# Patient Record
Sex: MALE | Race: OTHER
[De-identification: names, ages, dates, MRNs, and addresses within clinical notes are randomized per-mention and may not be internally consistent; named-entity substitution may affect disease eponyms.]

---

## 2018-10-05 ENCOUNTER — HOSPITAL ENCOUNTER (OUTPATIENT)
Dept: HOSPITAL 54 - LAB | Age: 28
Discharge: HOME | End: 2018-10-05
Attending: FAMILY MEDICINE
Payer: COMMERCIAL

## 2018-10-05 DIAGNOSIS — Z00.01: Primary | ICD-10-CM

## 2018-10-05 DIAGNOSIS — Z11.3: ICD-10-CM

## 2018-10-05 LAB
ALBUMIN SERPL BCP-MCNC: 4.3 G/DL (ref 3.4–5)
ALP SERPL-CCNC: 49 U/L (ref 46–116)
ALT SERPL W P-5'-P-CCNC: 27 U/L (ref 12–78)
AST SERPL W P-5'-P-CCNC: 21 U/L (ref 15–37)
BASOPHILS # BLD AUTO: 0 /CMM (ref 0–0.2)
BASOPHILS NFR BLD AUTO: 0.3 % (ref 0–2)
BILIRUB SERPL-MCNC: 1.7 MG/DL (ref 0.2–1)
BUN SERPL-MCNC: 15 MG/DL (ref 7–18)
CALCIUM SERPL-MCNC: 8.8 MG/DL (ref 8.5–10.1)
CHLORIDE SERPL-SCNC: 102 MMOL/L (ref 98–107)
CHOLEST SERPL-MCNC: 136 MG/DL (ref ?–200)
CO2 SERPL-SCNC: 28 MMOL/L (ref 21–32)
CREAT SERPL-MCNC: 1 MG/DL (ref 0.6–1.3)
EOSINOPHIL NFR BLD AUTO: 1.8 % (ref 0–6)
GLUCOSE SERPL-MCNC: 86 MG/DL (ref 74–106)
HCT VFR BLD AUTO: 47 % (ref 39–51)
HDLC SERPL-MCNC: 46 MG/DL (ref 40–60)
HGB BLD-MCNC: 15.7 G/DL (ref 13.5–17.5)
LDLC SERPL DIRECT ASSAY-MCNC: 79 MG/DL (ref 0–99)
LYMPHOCYTES NFR BLD AUTO: 1.4 /CMM (ref 0.8–4.8)
LYMPHOCYTES NFR BLD AUTO: 31.5 % (ref 20–44)
MCHC RBC AUTO-ENTMCNC: 33 G/DL (ref 31–36)
MCV RBC AUTO: 89 FL (ref 80–96)
MONOCYTES NFR BLD AUTO: 0.3 /CMM (ref 0.1–1.3)
MONOCYTES NFR BLD AUTO: 7.1 % (ref 2–12)
NEUTROPHILS # BLD AUTO: 2.6 /CMM (ref 1.8–8.9)
NEUTROPHILS NFR BLD AUTO: 59.3 % (ref 43–81)
PLATELET # BLD AUTO: 237 /CMM (ref 150–450)
POTASSIUM SERPL-SCNC: 3.6 MMOL/L (ref 3.5–5.1)
PROT SERPL-MCNC: 7.3 G/DL (ref 6.4–8.2)
RBC # BLD AUTO: 5.29 MIL/UL (ref 4.5–6)
RDW COEFFICIENT OF VARIATION: 12.5 (ref 11.5–15)
SODIUM SERPL-SCNC: 138 MMOL/L (ref 136–145)
TRIGL SERPL-MCNC: 180 MG/DL (ref 30–150)
TSH SERPL DL<=0.005 MIU/L-ACNC: 0.73 UIU/ML (ref 0.36–3.74)
WBC NRBC COR # BLD AUTO: 4.4 K/UL (ref 4.3–11)

## 2019-01-10 ENCOUNTER — HOSPITAL ENCOUNTER (EMERGENCY)
Dept: HOSPITAL 91 - E/R | Age: 29
Discharge: HOME | End: 2019-01-10
Payer: COMMERCIAL

## 2019-01-10 ENCOUNTER — HOSPITAL ENCOUNTER (EMERGENCY)
Dept: HOSPITAL 10 - E/R | Age: 29
Discharge: HOME | End: 2019-01-10
Payer: COMMERCIAL

## 2019-01-10 VITALS
BODY MASS INDEX: 26.35 KG/M2 | HEIGHT: 69 IN | WEIGHT: 177.91 LBS | BODY MASS INDEX: 26.35 KG/M2 | WEIGHT: 177.91 LBS | HEIGHT: 69 IN

## 2019-01-10 VITALS — DIASTOLIC BLOOD PRESSURE: 75 MMHG | RESPIRATION RATE: 20 BRPM | SYSTOLIC BLOOD PRESSURE: 128 MMHG | HEART RATE: 102 BPM

## 2019-01-10 DIAGNOSIS — S61.431A: Primary | ICD-10-CM
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
## 2019-01-10 LAB
ADD MAN DIFF?: NO
ALANINE AMINOTRANSFERASE: 33 IU/L (ref 13–69)
ALBUMIN: 5.1 G/DL (ref 3.3–4.9)
ALKALINE PHOSPHATASE: 48 IU/L (ref 42–121)
ANION GAP: 13 (ref 5–13)
ASPARTATE AMINO TRANSFERASE: 30 IU/L (ref 15–46)
BASOPHIL #: 0 10^3/UL (ref 0–0.1)
BASOPHILS %: 0.2 % (ref 0–2)
BILIRUBIN,DIRECT: 0 MG/DL (ref 0–0.2)
BILIRUBIN,TOTAL: 1.4 MG/DL (ref 0.2–1.3)
BLOOD UREA NITROGEN: 12 MG/DL (ref 7–20)
CALCIUM: 10.2 MG/DL (ref 8.4–10.2)
CARBON DIOXIDE: 28 MMOL/L (ref 21–31)
CHLORIDE: 102 MMOL/L (ref 97–110)
CREATININE: 0.82 MG/DL (ref 0.61–1.24)
EOSINOPHILS #: 0 10^3/UL (ref 0–0.5)
EOSINOPHILS %: 0.2 % (ref 0–7)
GLUCOSE: 96 MG/DL (ref 70–220)
HEMATOCRIT: 45.8 % (ref 42–52)
HEMOGLOBIN: 16 G/DL (ref 14–18)
HEPATITIS B CORE ANTIBODY: NEGATIVE
HEPATITIS B SURFACE ANTIBODY: POSITIVE
HEPATITIS B SURFACE ANTIGEN: NEGATIVE
HEPATITIS C VIRAL ANTIBODY: NEGATIVE
HEPATITIS C VIRAL ANTIBODY: NEGATIVE
HIV 1&2 ANTIBODY: NEGATIVE
IMMATURE GRANS #M: 0.01 10^3/UL (ref 0–0.03)
IMMATURE GRANS % (M): 0.2 % (ref 0–0.43)
LIPASE: 160 U/L (ref 23–300)
LYMPHOCYTES #: 1.2 10^3/UL (ref 0.8–2.9)
LYMPHOCYTES %: 19.8 % (ref 15–51)
MEAN CORPUSCULAR HEMOGLOBIN: 29.8 PG (ref 29–33)
MEAN CORPUSCULAR HGB CONC: 34.9 G/DL (ref 32–37)
MEAN CORPUSCULAR VOLUME: 85.3 FL (ref 82–101)
MEAN PLATELET VOLUME: 10.3 FL (ref 7.4–10.4)
MONOCYTE #: 0.4 10^3/UL (ref 0.3–0.9)
MONOCYTES %: 5.8 % (ref 0–11)
NEUTROPHIL #: 4.6 10^3/UL (ref 1.6–7.5)
NEUTROPHILS %: 73.8 % (ref 39–77)
NUCLEATED RED BLOOD CELLS #: 0 10^3/UL (ref 0–0)
NUCLEATED RED BLOOD CELLS%: 0 /100WBC (ref 0–0)
PLATELET COUNT: 216 10^3/UL (ref 140–415)
POTASSIUM: 4.2 MMOL/L (ref 3.5–5.1)
RED BLOOD COUNT: 5.37 10^6/UL (ref 4.7–6.1)
RED CELL DISTRIBUTION WIDTH: 12 % (ref 11.5–14.5)
SODIUM: 143 MMOL/L (ref 135–144)
TOTAL PROTEIN: 7.9 G/DL (ref 6.1–8.1)
WHITE BLOOD COUNT: 6.2 10^3/UL (ref 4.8–10.8)

## 2019-01-10 PROCEDURE — 86803 HEPATITIS C AB TEST: CPT

## 2019-01-10 PROCEDURE — 86704 HEP B CORE ANTIBODY TOTAL: CPT

## 2019-01-10 PROCEDURE — 80048 BASIC METABOLIC PNL TOTAL CA: CPT

## 2019-01-10 PROCEDURE — 85025 COMPLETE CBC W/AUTO DIFF WBC: CPT

## 2019-01-10 PROCEDURE — 86706 HEP B SURFACE ANTIBODY: CPT

## 2019-01-10 PROCEDURE — 80076 HEPATIC FUNCTION PANEL: CPT

## 2019-01-10 PROCEDURE — 99283 EMERGENCY DEPT VISIT LOW MDM: CPT

## 2019-01-10 PROCEDURE — 87340 HEPATITIS B SURFACE AG IA: CPT

## 2019-01-10 PROCEDURE — 86703 HIV-1/HIV-2 1 RESULT ANTBDY: CPT

## 2019-01-10 PROCEDURE — 83690 ASSAY OF LIPASE: CPT

## 2019-01-10 PROCEDURE — 87536 HIV-1 QUANT&REVRSE TRNSCRPJ: CPT

## 2019-01-10 NOTE — ERD
ER Documentation


Chief Complaint


Chief Complaint





needle stick ( right hand)





HPI


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 


needlestick injury to the right palm while placing a 20-gauge intravenous line 


in a patient.  Site was copiously irrigated and cleansed.  Patient is 


asymptomatic and has no complaints, no significant past medical history to 


disclose related to this incident





ROS


All systems reviewed and are negative except as per history of present illness.





Allergies


Allergies:  


Coded Allergies:  


     Penicillins (Verified  Allergy, Mild, 1/10/19)





PMhx/Soc


Medical and Surgical Hx:  pt denies Medical Hx, pt denies Surgical Hx


Hx Alcohol Use:  No


Hx Substance Use:  No


Hx Tobacco Use:  No


Smoking Status:  Never smoker





FmHx


Family History:  No diabetes





Physical Exam


Vitals





Vital Signs


  Date      Temp  Pulse  Resp  B/P (MAP)   Pulse Ox  O2          O2 Flow    FiO2


Time                                                 Delivery    Rate


   1/10/19  98.5    102    20      128/75        98


     15:38                           (92)





Physical Exam


Const:   No acute distress, afebrile


Skin:   No petechiae or rashes no obvious puncture wound noted no active 


bleeding right palm appears clean and dry


Ext:    No cyanosis, or edema, no gross deformities


Neur:   Awake and alert x3, gait normal, no focal deficits or facial asymmetry


Psych:    Normal Mood and Affect


Result Diagram:  


1/10/19 1603                                                                    


           1/10/19 1603





Results 24 hrs





Laboratory Tests


              Test
                                 1/10/19
16:03


              White Blood Count                      6.2 10^3/ul


              Red Blood Count                       5.37 10^6/ul


              Hemoglobin                               16.0 g/dl


              Hematocrit                                  45.8 %


              Mean Corpuscular Volume                    85.3 fl


              Mean Corpuscular Hemoglobin                29.8 pg


              Mean Corpuscular Hemoglobin
Concent     34.9 g/dl 



              Red Cell Distribution Width                 12.0 %


              Platelet Count                         216 10^3/UL


              Mean Platelet Volume                       10.3 fl


              Immature Granulocytes %                    0.200 %


              Neutrophils %                               73.8 %


              Lymphocytes %                               19.8 %


              Monocytes %                                  5.8 %


              Eosinophils %                                0.2 %


              Basophils %                                  0.2 %


              Nucleated Red Blood Cells %            0.0 /100WBC


              Immature Granulocytes #              0.010 10^3/ul


              Neutrophils #                          4.6 10^3/ul


              Lymphocytes #                          1.2 10^3/ul


              Monocytes #                            0.4 10^3/ul


              Eosinophils #                          0.0 10^3/ul


              Basophils #                            0.0 10^3/ul


              Nucleated Red Blood Cells #            0.0 10^3/ul


              Sodium Level                            143 mmol/L


              Potassium Level                         4.2 mmol/L


              Chloride Level                          102 mmol/L


              Carbon Dioxide Level                     28 mmol/L


              Anion Gap                                       13


              Blood Urea Nitrogen                       12 mg/dl


              Creatinine                              0.82 mg/dl


              Est Glomerular Filtrat Rate
mL/min   > 60 mL/min 



              Glucose Level                             96 mg/dl


              Calcium Level                           10.2 mg/dl


              Total Bilirubin                          1.4 mg/dl


              Direct Bilirubin                        0.00 mg/dl


              Indirect Bilirubin                       1.4 mg/dl


              Aspartate Amino Transf
(AST/SGOT)         30 IU/L 



              Alanine Aminotransferase
(ALT/SGPT)       33 IU/L 



              Alkaline Phosphatase                       48 IU/L


              Total Protein                             7.9 g/dl


              Albumin                                   5.1 g/dl


              Lipase                                     160 U/L


              Hepatitis B Surface Antigen          NEGATIVE


              Hepatitis B Surface Antibody         POSITIVE


              Hepatitis B Core Total
Antibody      NEGATIVE 



              Hepatitis C Antibody                 NEGATIVE


              HIV (1&2) Antibody                   NEGATIVE








Procedures/MDM


Occupational health department was closed and patient was evaluated by me for 


workplace needlestick injury while placing IV line.  





Mr. Youngblood's labs were drawn and serology studies were negative.  Patient's 


tetanus immunization is up-to-date





No further action recommended, patient to be discharged with recommendations for


regularly scheduled follow-up with PMD and follow-up at occupational health 


department when open.





Patient feels much better at this time, and vital signs are normal, symptoms 


have improved. I did give strict instructions to return to the ED if symptoms 


continue or worsen, patient will otherwise follow-up with primary care 


physician. Patient understood instructions and agreed to plan.





Disclaimer: Inadvertent spelling and grammatical errors are likely due to EHR/d


ictation software use and do not reflect on the overall quality of patient care.


Also, please note that the electronic time recorded on this note does not 


necessarily reflect the actual time of the patient encounter.





Departure


Diagnosis:  


   Primary Impression:  


   Needlestick injury accident


Condition:  Good


Patient Instructions:  Drug Abuse











IMMANUEL SANTIAGO MD             Andres 10, 2019 18:03

## 2019-01-12 LAB — HCVA NOTE: (no result)
